# Patient Record
Sex: FEMALE | Race: WHITE | Employment: FULL TIME | URBAN - METROPOLITAN AREA
[De-identification: names, ages, dates, MRNs, and addresses within clinical notes are randomized per-mention and may not be internally consistent; named-entity substitution may affect disease eponyms.]

---

## 2017-03-17 RX ORDER — LEVONORGESTREL AND ETHINYL ESTRADIOL 0.1-0.02MG
KIT ORAL
Qty: 84 TABLET | Refills: 0 | Status: SHIPPED | OUTPATIENT
Start: 2017-03-17 | End: 2017-04-14

## 2017-04-14 ENCOUNTER — OFFICE VISIT (OUTPATIENT)
Dept: FAMILY MEDICINE CLINIC | Facility: CLINIC | Age: 27
End: 2017-04-14

## 2017-04-14 VITALS
HEIGHT: 63 IN | OXYGEN SATURATION: 97 % | BODY MASS INDEX: 37.56 KG/M2 | HEART RATE: 78 BPM | DIASTOLIC BLOOD PRESSURE: 82 MMHG | SYSTOLIC BLOOD PRESSURE: 118 MMHG | RESPIRATION RATE: 18 BRPM | TEMPERATURE: 98 F | WEIGHT: 212 LBS

## 2017-04-14 DIAGNOSIS — J01.00 ACUTE MAXILLARY SINUSITIS, RECURRENCE NOT SPECIFIED: Primary | ICD-10-CM

## 2017-04-14 DIAGNOSIS — R09.82 POST-NASAL DRIP: ICD-10-CM

## 2017-04-14 PROCEDURE — 99213 OFFICE O/P EST LOW 20 MIN: CPT | Performed by: PHYSICIAN ASSISTANT

## 2017-04-14 RX ORDER — FLUTICASONE PROPIONATE 50 MCG
1 SPRAY, SUSPENSION (ML) NASAL DAILY
Qty: 1 BOTTLE | Refills: 1 | Status: SHIPPED | OUTPATIENT
Start: 2017-04-14 | End: 2017-05-14

## 2017-04-14 RX ORDER — DOXYCYCLINE HYCLATE 100 MG
100 TABLET ORAL 2 TIMES DAILY
Qty: 20 TABLET | Refills: 0 | Status: SHIPPED | OUTPATIENT
Start: 2017-04-14 | End: 2018-08-09

## 2017-04-14 NOTE — PROGRESS NOTES
HPI:   Surinder Valverde is a 23year old female who presents for sinus symptoms for  1  weeks. Pt originally scheduled for physical but she does not wish to do this today. Would like to discuss her sinus/cold symptoms.  Pt seen at the [address] 37.56 kg/m2  SpO2 97%  LMP 03/31/2017  GENERAL: well developed and in no apparent distress  SKIN: warm & dry, no rash  EYES:PERRLA, conjunctiva are clear  HEENT: atraumatic, normocephalic, TMs on right with effusion and mild erythema, +widened light reflex

## 2017-06-09 RX ORDER — LEVONORGESTREL AND ETHINYL ESTRADIOL 0.1-0.02MG
KIT ORAL
Qty: 84 TABLET | Refills: 0 | Status: SHIPPED | OUTPATIENT
Start: 2017-06-09 | End: 2018-06-26

## 2017-09-07 RX ORDER — LEVONORGESTREL AND ETHINYL ESTRADIOL 0.1-0.02MG
KIT ORAL
Qty: 84 TABLET | Refills: 0 | Status: SHIPPED | OUTPATIENT
Start: 2017-09-07 | End: 2018-06-26

## 2017-11-30 RX ORDER — LEVONORGESTREL AND ETHINYL ESTRADIOL 0.1-0.02MG
KIT ORAL
Qty: 84 TABLET | Refills: 0 | Status: SHIPPED | OUTPATIENT
Start: 2017-11-30 | End: 2018-06-26

## 2018-02-22 RX ORDER — LEVONORGESTREL AND ETHINYL ESTRADIOL 0.1-0.02MG
KIT ORAL
Qty: 84 TABLET | Refills: 0 | Status: SHIPPED | OUTPATIENT
Start: 2018-02-22 | End: 2018-06-26

## 2018-06-26 RX ORDER — LEVONORGESTREL AND ETHINYL ESTRADIOL 0.1-0.02MG
KIT ORAL
Qty: 84 TABLET | Refills: 0 | Status: SHIPPED | OUTPATIENT
Start: 2018-06-26 | End: 2018-09-28

## 2018-08-09 ENCOUNTER — OFFICE VISIT (OUTPATIENT)
Dept: FAMILY MEDICINE CLINIC | Facility: CLINIC | Age: 28
End: 2018-08-09
Payer: COMMERCIAL

## 2018-08-09 VITALS
BODY MASS INDEX: 37.56 KG/M2 | HEIGHT: 63 IN | DIASTOLIC BLOOD PRESSURE: 80 MMHG | HEART RATE: 118 BPM | SYSTOLIC BLOOD PRESSURE: 124 MMHG | TEMPERATURE: 99 F | WEIGHT: 212 LBS

## 2018-08-09 DIAGNOSIS — N30.01 ACUTE CYSTITIS WITH HEMATURIA: Primary | ICD-10-CM

## 2018-08-09 LAB
MULTISTIX LOT#: NORMAL NUMERIC
PH, URINE: 5 (ref 4.5–8)
SPECIFIC GRAVITY: 1.02 (ref 1–1.03)

## 2018-08-09 PROCEDURE — 99213 OFFICE O/P EST LOW 20 MIN: CPT | Performed by: INTERNAL MEDICINE

## 2018-08-09 PROCEDURE — 81003 URINALYSIS AUTO W/O SCOPE: CPT | Performed by: INTERNAL MEDICINE

## 2018-08-09 PROCEDURE — 87086 URINE CULTURE/COLONY COUNT: CPT | Performed by: INTERNAL MEDICINE

## 2018-08-09 RX ORDER — CIPROFLOXACIN 250 MG/1
250 TABLET, FILM COATED ORAL 2 TIMES DAILY
Qty: 14 TABLET | Refills: 0 | Status: SHIPPED | OUTPATIENT
Start: 2018-08-09 | End: 2018-08-16

## 2018-08-09 NOTE — PROGRESS NOTES
HPI:   Myrtle Reis is a 33year old female who presents with urinary symptoms for 2 days. Complains of no urinary frequency, no urgency, + dysuria, no suprapubic pressure. Denies back pain. Denies hematuria. Denies fever.     Diabetic:no  Used [place]

## 2018-09-28 RX ORDER — LEVONORGESTREL AND ETHINYL ESTRADIOL 0.1-0.02MG
KIT ORAL
Qty: 84 TABLET | Refills: 0 | Status: SHIPPED | OUTPATIENT
Start: 2018-09-28 | End: 2020-03-31

## 2018-12-24 RX ORDER — LEVONORGESTREL AND ETHINYL ESTRADIOL 0.1-0.02MG
KIT ORAL
Qty: 84 TABLET | Refills: 0 | OUTPATIENT
Start: 2018-12-24

## 2018-12-26 NOTE — TELEPHONE ENCOUNTER
I tried calling the patient to let her know she needs to make a appt for future refills but her voicemail is full.

## 2019-01-15 RX ORDER — LEVONORGESTREL AND ETHINYL ESTRADIOL 0.1-0.02MG
KIT ORAL
Qty: 84 TABLET | Refills: 0 | Status: SHIPPED | OUTPATIENT
Start: 2019-01-15 | End: 2020-03-31

## 2019-04-08 RX ORDER — LEVONORGESTREL AND ETHINYL ESTRADIOL 0.1-0.02MG
KIT ORAL
Qty: 84 TABLET | Refills: 0 | Status: SHIPPED | OUTPATIENT
Start: 2019-04-08 | End: 2020-03-31

## 2019-07-05 RX ORDER — LEVONORGESTREL AND ETHINYL ESTRADIOL 0.1-0.02MG
KIT ORAL
Qty: 84 TABLET | Refills: 0 | Status: SHIPPED | OUTPATIENT
Start: 2019-07-05 | End: 2020-03-31

## 2020-03-31 ENCOUNTER — OFFICE VISIT (OUTPATIENT)
Dept: OBGYN CLINIC | Facility: CLINIC | Age: 30
End: 2020-03-31
Payer: COMMERCIAL

## 2020-03-31 ENCOUNTER — LAB ENCOUNTER (OUTPATIENT)
Dept: LAB | Facility: REFERENCE LAB | Age: 30
End: 2020-03-31
Attending: OBSTETRICS & GYNECOLOGY
Payer: COMMERCIAL

## 2020-03-31 VITALS
HEIGHT: 62 IN | SYSTOLIC BLOOD PRESSURE: 118 MMHG | BODY MASS INDEX: 28.34 KG/M2 | WEIGHT: 154 LBS | DIASTOLIC BLOOD PRESSURE: 74 MMHG

## 2020-03-31 DIAGNOSIS — Z36.9 ENCOUNTER FOR ANTENATAL SCREENING OF MOTHER: Primary | ICD-10-CM

## 2020-03-31 DIAGNOSIS — Z36.9 ENCOUNTER FOR ANTENATAL SCREENING OF MOTHER: ICD-10-CM

## 2020-03-31 PROBLEM — O09.30 LATE PRENATAL CARE: Status: ACTIVE | Noted: 2020-03-31

## 2020-03-31 PROBLEM — Z34.92 ENCOUNTER FOR SUPERVISION OF NORMAL PREGNANCY IN SECOND TRIMESTER: Status: ACTIVE | Noted: 2020-03-31

## 2020-03-31 PROBLEM — Z34.92 ENCOUNTER FOR SUPERVISION OF NORMAL PREGNANCY IN SECOND TRIMESTER (HCC): Status: ACTIVE | Noted: 2020-03-31

## 2020-03-31 PROBLEM — O09.30 LATE PRENATAL CARE (HCC): Status: ACTIVE | Noted: 2020-03-31

## 2020-03-31 PROCEDURE — 86901 BLOOD TYPING SEROLOGIC RH(D): CPT

## 2020-03-31 PROCEDURE — 85025 COMPLETE CBC W/AUTO DIFF WBC: CPT

## 2020-03-31 PROCEDURE — 99204 OFFICE O/P NEW MOD 45 MIN: CPT | Performed by: OBSTETRICS & GYNECOLOGY

## 2020-03-31 PROCEDURE — 86850 RBC ANTIBODY SCREEN: CPT

## 2020-03-31 PROCEDURE — 36415 COLL VENOUS BLD VENIPUNCTURE: CPT

## 2020-03-31 PROCEDURE — 86780 TREPONEMA PALLIDUM: CPT

## 2020-03-31 PROCEDURE — 87389 HIV-1 AG W/HIV-1&-2 AB AG IA: CPT

## 2020-03-31 PROCEDURE — 86762 RUBELLA ANTIBODY: CPT

## 2020-03-31 PROCEDURE — 86900 BLOOD TYPING SEROLOGIC ABO: CPT

## 2020-03-31 PROCEDURE — 87340 HEPATITIS B SURFACE AG IA: CPT

## 2020-03-31 NOTE — PATIENT INSTRUCTIONS
Adapting to Pregnancy: Second Trimester    Keep up the healthy habits you started in your first trimester. You might be a little more tired than normal. So plan your day wisely. Look at the tips below and choose the ones that suit your lifestyle.   If you Continuing as lovers  Unless your healthcare provider tells you otherwise, there is no reason to stop having sex now. Blood supply increases to the pelvic area in the second trimester. Because of this, sex might be more enjoyable.  Try different positions a

## 2020-03-31 NOTE — H&P
Called and confirmed appointment with patient for Tues 6/5/18 at 1:30p.   [facility]  Obstetrics and Gynecology    Subjective:     Angela Michael is a 30year old  female presents with c/o secondary amenorrhea and positive pregnancy test.  No LMP recorded (within months). Patient is pregnant. .  The Smokeless tobacco: Never Used    Substance and Sexual Activity      Alcohol use:  Yes        Alcohol/week: 0.0 standard drinks        Comment: SOCIALLY      Drug use: No      Sexual activity: Not on file    Lifestyle      Physical activity:        Days per uterine fundus palpated at the umbilicus, non distended; non tender, no masses  EXTREMITIES:  Normal range of motion, strength 5/5, nontender without edema    Labs:   20 Point-of-care urine pregnancy test positive    Imagin20 bedside ultras

## 2020-04-01 ENCOUNTER — ULTRASOUND ENCOUNTER (OUTPATIENT)
Dept: OBGYN CLINIC | Facility: CLINIC | Age: 30
End: 2020-04-01
Payer: COMMERCIAL

## 2020-04-01 ENCOUNTER — APPOINTMENT (OUTPATIENT)
Dept: LAB | Facility: REFERENCE LAB | Age: 30
End: 2020-04-01
Attending: OBSTETRICS & GYNECOLOGY
Payer: COMMERCIAL

## 2020-04-01 DIAGNOSIS — Z36.9 ENCOUNTER FOR ANTENATAL SCREENING OF MOTHER: ICD-10-CM

## 2020-04-01 DIAGNOSIS — Z36.9 ENCOUNTER FOR ANTENATAL SCREENING OF MOTHER: Primary | ICD-10-CM

## 2020-04-01 PROCEDURE — 76805 OB US >/= 14 WKS SNGL FETUS: CPT | Performed by: OBSTETRICS & GYNECOLOGY

## 2020-04-01 PROCEDURE — 76817 TRANSVAGINAL US OBSTETRIC: CPT | Performed by: OBSTETRICS & GYNECOLOGY

## 2020-04-15 ENCOUNTER — ROUTINE PRENATAL (OUTPATIENT)
Dept: OBGYN CLINIC | Facility: CLINIC | Age: 30
End: 2020-04-15
Payer: COMMERCIAL

## 2020-04-15 VITALS
DIASTOLIC BLOOD PRESSURE: 74 MMHG | SYSTOLIC BLOOD PRESSURE: 112 MMHG | HEIGHT: 62 IN | BODY MASS INDEX: 28.89 KG/M2 | WEIGHT: 157 LBS

## 2020-04-15 DIAGNOSIS — Z36.9 UNSPECIFIED ANTENATAL SCREENING: Primary | ICD-10-CM

## 2020-04-15 PROBLEM — Z13.79 GENETIC SCREENING: Status: ACTIVE | Noted: 2020-04-15

## 2020-04-15 PROCEDURE — 81002 URINALYSIS NONAUTO W/O SCOPE: CPT | Performed by: OBSTETRICS & GYNECOLOGY

## 2020-04-15 NOTE — PROGRESS NOTES
[address]  Obstetrics and Gynecology  History & Physical    CC: Patient is here to establish prenatal care     Subjective:     HPI:  Marion Cortez is a 30year old  female at 26w1d who presents today to establish prenatal ca use: Yes      Alcohol/week: 0.0 standard drinks      Comment: SOCIALLY    Drug use: No      Marital status- Not , boyfriend is father of baby and supportive    Patient feels unsafe or threatened?: No    Review of Systems:  General:  denies fevers, c Repeat US ordered. - continue PNV daily      Genetic Screening tests  - Discussion held with patient about negative cell free DNA and fetal infant.   - Reviewed Foresight carrier screen negative.   - Thalassemia: pt noted her mother has thalassemia and wh

## 2020-04-30 ENCOUNTER — ROUTINE PRENATAL (OUTPATIENT)
Dept: OBGYN CLINIC | Facility: CLINIC | Age: 30
End: 2020-04-30
Payer: COMMERCIAL

## 2020-04-30 ENCOUNTER — LAB ENCOUNTER (OUTPATIENT)
Dept: LAB | Facility: REFERENCE LAB | Age: 30
End: 2020-04-30
Attending: OBSTETRICS & GYNECOLOGY
Payer: COMMERCIAL

## 2020-04-30 VITALS
HEIGHT: 62 IN | DIASTOLIC BLOOD PRESSURE: 58 MMHG | BODY MASS INDEX: 29.85 KG/M2 | SYSTOLIC BLOOD PRESSURE: 108 MMHG | WEIGHT: 162.19 LBS

## 2020-04-30 DIAGNOSIS — N89.8 VAGINAL DISCHARGE: ICD-10-CM

## 2020-04-30 DIAGNOSIS — Z36.9 UNSPECIFIED ANTENATAL SCREENING: ICD-10-CM

## 2020-04-30 DIAGNOSIS — Z36.9 ENCOUNTER FOR ANTENATAL SCREENING OF MOTHER: Primary | ICD-10-CM

## 2020-04-30 PROCEDURE — 82950 GLUCOSE TEST: CPT

## 2020-04-30 PROCEDURE — 90715 TDAP VACCINE 7 YRS/> IM: CPT | Performed by: OBSTETRICS & GYNECOLOGY

## 2020-04-30 PROCEDURE — 87591 N.GONORRHOEAE DNA AMP PROB: CPT | Performed by: OBSTETRICS & GYNECOLOGY

## 2020-04-30 PROCEDURE — 87808 TRICHOMONAS ASSAY W/OPTIC: CPT | Performed by: OBSTETRICS & GYNECOLOGY

## 2020-04-30 PROCEDURE — 87205 SMEAR GRAM STAIN: CPT | Performed by: OBSTETRICS & GYNECOLOGY

## 2020-04-30 PROCEDURE — 87106 FUNGI IDENTIFICATION YEAST: CPT | Performed by: OBSTETRICS & GYNECOLOGY

## 2020-04-30 PROCEDURE — 87491 CHLMYD TRACH DNA AMP PROBE: CPT | Performed by: OBSTETRICS & GYNECOLOGY

## 2020-04-30 PROCEDURE — 36415 COLL VENOUS BLD VENIPUNCTURE: CPT

## 2020-04-30 PROCEDURE — 85025 COMPLETE CBC W/AUTO DIFF WBC: CPT

## 2020-04-30 PROCEDURE — 90471 IMMUNIZATION ADMIN: CPT | Performed by: OBSTETRICS & GYNECOLOGY

## 2020-04-30 PROCEDURE — 81002 URINALYSIS NONAUTO W/O SCOPE: CPT | Performed by: OBSTETRICS & GYNECOLOGY

## 2020-04-30 NOTE — PROGRESS NOTES
Tdap Vaccine administered in RIGHT arm IM. Patient is 28w2d pregnant. Patient tolerated injection well no reaction at this time. 2 patient identifiers verified with pt. Ordering Dr. Wally Ashley.

## 2020-04-30 NOTE — PROGRESS NOTES
WILDA  Pt is a 30year old  at 28w2d   Doing well. Denies LOF/VB/uctx. +FM.    Rh positive, TDAP today  Fetal movement count reviewed    BPM   FH 28 cm   Pap collected today and vaginal culture for discharge noted on exam  Follow up anatomy sca

## 2020-05-06 ENCOUNTER — TELEPHONE (OUTPATIENT)
Dept: FAMILY MEDICINE CLINIC | Facility: CLINIC | Age: 30
End: 2020-05-06

## 2020-05-06 NOTE — TELEPHONE ENCOUNTER
Called [facility] results normal.  Component  Ref Range & Units 4/30/20  2:47 PM   Hemoglobin A  95.5 - 100.0 % 97.1    Hemoglobin A2  1.5 - 3.5 % 2.6    Hemoglobin F (Fetal)  0.0 - 2.0 % 0.3    HGB Electophoresis Interp    Comment:    Normal capillary hem

## 2020-05-07 ENCOUNTER — ULTRASOUND ENCOUNTER (OUTPATIENT)
Dept: OBGYN CLINIC | Facility: CLINIC | Age: 30
End: 2020-05-07
Payer: COMMERCIAL

## 2020-05-07 DIAGNOSIS — Z36.9 UNSPECIFIED ANTENATAL SCREENING: ICD-10-CM

## 2020-05-07 PROCEDURE — 76816 OB US FOLLOW-UP PER FETUS: CPT | Performed by: OBSTETRICS & GYNECOLOGY

## 2020-06-01 ENCOUNTER — ROUTINE PRENATAL (OUTPATIENT)
Dept: OBGYN CLINIC | Facility: CLINIC | Age: 30
End: 2020-06-01
Payer: COMMERCIAL

## 2020-06-01 VITALS
HEIGHT: 62 IN | DIASTOLIC BLOOD PRESSURE: 56 MMHG | SYSTOLIC BLOOD PRESSURE: 108 MMHG | WEIGHT: 165 LBS | BODY MASS INDEX: 30.36 KG/M2

## 2020-06-01 DIAGNOSIS — O99.013 ANEMIA AFFECTING PREGNANCY IN THIRD TRIMESTER: ICD-10-CM

## 2020-06-01 DIAGNOSIS — O09.30 LATE PRENATAL CARE: ICD-10-CM

## 2020-06-01 DIAGNOSIS — Z36.9 UNSPECIFIED ANTENATAL SCREENING: Primary | ICD-10-CM

## 2020-06-01 NOTE — PROGRESS NOTES
No complaints with active FM. Reviewed results of 28w anemia Hgb of 10.6 and normal Hgb electrophoresis. To begin Slow Fe and schedule next 2 appointments with Brayan Rodriguez and Isabella.

## 2020-06-15 ENCOUNTER — ROUTINE PRENATAL (OUTPATIENT)
Dept: OBGYN CLINIC | Facility: CLINIC | Age: 30
End: 2020-06-15
Payer: COMMERCIAL

## 2020-06-15 VITALS
SYSTOLIC BLOOD PRESSURE: 120 MMHG | BODY MASS INDEX: 30.91 KG/M2 | WEIGHT: 168 LBS | HEIGHT: 62 IN | DIASTOLIC BLOOD PRESSURE: 64 MMHG

## 2020-06-15 DIAGNOSIS — Z36.9 UNSPECIFIED ANTENATAL SCREENING: Primary | ICD-10-CM

## 2020-06-15 PROCEDURE — 81002 URINALYSIS NONAUTO W/O SCOPE: CPT | Performed by: OBSTETRICS & GYNECOLOGY

## 2020-06-15 NOTE — PROGRESS NOTES
Audrey Celesteins here for prenatal check. She has no complaints. She reports active fetal movements. Her prenatal exam today was completely normal I discussed fetal kick counts, spontaneous rupture of membranes, vaginal bleeding with her.   Also discussed was eliud

## 2020-06-29 ENCOUNTER — LAB ENCOUNTER (OUTPATIENT)
Dept: LAB | Facility: REFERENCE LAB | Age: 30
End: 2020-06-29
Attending: OBSTETRICS & GYNECOLOGY
Payer: COMMERCIAL

## 2020-06-29 ENCOUNTER — ROUTINE PRENATAL (OUTPATIENT)
Dept: OBGYN CLINIC | Facility: CLINIC | Age: 30
End: 2020-06-29
Payer: COMMERCIAL

## 2020-06-29 VITALS
DIASTOLIC BLOOD PRESSURE: 66 MMHG | WEIGHT: 173 LBS | BODY MASS INDEX: 31.83 KG/M2 | HEIGHT: 62 IN | SYSTOLIC BLOOD PRESSURE: 110 MMHG

## 2020-06-29 DIAGNOSIS — Z36.9 UNSPECIFIED ANTENATAL SCREENING: ICD-10-CM

## 2020-06-29 DIAGNOSIS — Z36.9 UNSPECIFIED ANTENATAL SCREENING: Primary | ICD-10-CM

## 2020-06-29 LAB
DEPRECATED RDW RBC AUTO: 39.7 FL (ref 35.1–46.3)
ERYTHROCYTE [DISTWIDTH] IN BLOOD BY AUTOMATED COUNT: 12.2 % (ref 11–15)
HCT VFR BLD AUTO: 31.4 % (ref 35–48)
HGB BLD-MCNC: 10.6 G/DL (ref 12–16)
MCH RBC QN AUTO: 30.3 PG (ref 26–34)
MCHC RBC AUTO-ENTMCNC: 33.8 G/DL (ref 31–37)
MCV RBC AUTO: 89.7 FL (ref 80–100)
PLATELET # BLD AUTO: 165 10(3)UL (ref 150–450)
RBC # BLD AUTO: 3.5 X10(6)UL (ref 3.8–5.3)
WBC # BLD AUTO: 12 X10(3) UL (ref 4–11)

## 2020-06-29 PROCEDURE — 87081 CULTURE SCREEN ONLY: CPT | Performed by: OBSTETRICS & GYNECOLOGY

## 2020-06-29 PROCEDURE — 86780 TREPONEMA PALLIDUM: CPT

## 2020-06-29 PROCEDURE — 87653 STREP B DNA AMP PROBE: CPT | Performed by: OBSTETRICS & GYNECOLOGY

## 2020-06-29 PROCEDURE — 85027 COMPLETE CBC AUTOMATED: CPT

## 2020-06-29 PROCEDURE — 81002 URINALYSIS NONAUTO W/O SCOPE: CPT | Performed by: OBSTETRICS & GYNECOLOGY

## 2020-06-29 PROCEDURE — 36415 COLL VENOUS BLD VENIPUNCTURE: CPT

## 2020-06-29 PROCEDURE — 87389 HIV-1 AG W/HIV-1&-2 AB AG IA: CPT

## 2020-07-01 ENCOUNTER — ANESTHESIA (OUTPATIENT)
Dept: OBGYN UNIT | Facility: HOSPITAL | Age: 30
End: 2020-07-01
Payer: COMMERCIAL

## 2020-07-01 ENCOUNTER — HOSPITAL ENCOUNTER (INPATIENT)
Facility: HOSPITAL | Age: 30
LOS: 2 days | Discharge: HOME | End: 2020-07-03
Attending: OBSTETRICS & GYNECOLOGY | Admitting: OBSTETRICS & GYNECOLOGY
Payer: COMMERCIAL

## 2020-07-01 ENCOUNTER — ANESTHESIA EVENT (OUTPATIENT)
Dept: OBGYN UNIT | Facility: HOSPITAL | Age: 30
End: 2020-07-01
Payer: COMMERCIAL

## 2020-07-01 PROBLEM — Z34.90 PREGNANCY (HCC): Status: ACTIVE | Noted: 2020-07-01

## 2020-07-01 PROBLEM — Z34.90 PREGNANCY: Status: ACTIVE | Noted: 2020-07-01

## 2020-07-01 LAB
ANTIBODY SCREEN: NEGATIVE
BASOPHILS # BLD AUTO: 0.04 X10(3) UL (ref 0–0.2)
BASOPHILS NFR BLD AUTO: 0.3 %
DEPRECATED RDW RBC AUTO: 38.8 FL (ref 35.1–46.3)
EOSINOPHIL # BLD AUTO: 0.09 X10(3) UL (ref 0–0.7)
EOSINOPHIL NFR BLD AUTO: 0.6 %
ERYTHROCYTE [DISTWIDTH] IN BLOOD BY AUTOMATED COUNT: 12 % (ref 11–15)
HCT VFR BLD AUTO: 32.1 % (ref 35–48)
HGB BLD-MCNC: 11.1 G/DL (ref 12–16)
IMM GRANULOCYTES # BLD AUTO: 0.18 X10(3) UL (ref 0–1)
IMM GRANULOCYTES NFR BLD: 1.3 %
LYMPHOCYTES # BLD AUTO: 1.88 X10(3) UL (ref 1–4)
LYMPHOCYTES NFR BLD AUTO: 13.1 %
MCH RBC QN AUTO: 30.6 PG (ref 26–34)
MCHC RBC AUTO-ENTMCNC: 34.6 G/DL (ref 31–37)
MCV RBC AUTO: 88.4 FL (ref 80–100)
MONOCYTES # BLD AUTO: 0.89 X10(3) UL (ref 0.1–1)
MONOCYTES NFR BLD AUTO: 6.2 %
NEUTROPHILS # BLD AUTO: 11.25 X10 (3) UL (ref 1.5–7.7)
NEUTROPHILS # BLD AUTO: 11.25 X10(3) UL (ref 1.5–7.7)
NEUTROPHILS NFR BLD AUTO: 78.5 %
PLATELET # BLD AUTO: 177 10(3)UL (ref 150–450)
RBC # BLD AUTO: 3.63 X10(6)UL (ref 3.8–5.3)
RH BLOOD TYPE: POSITIVE
SARS-COV-2 RNA RESP QL NAA+PROBE: NOT DETECTED
T PALLIDUM AB SER QL IA: NONREACTIVE
T PALLIDUM AB SER QL: NEGATIVE
WBC # BLD AUTO: 14.3 X10(3) UL (ref 4–11)

## 2020-07-01 PROCEDURE — 86901 BLOOD TYPING SEROLOGIC RH(D): CPT | Performed by: OBSTETRICS & GYNECOLOGY

## 2020-07-01 PROCEDURE — 0HQ9XZZ REPAIR PERINEUM SKIN, EXTERNAL APPROACH: ICD-10-PCS | Performed by: OBSTETRICS & GYNECOLOGY

## 2020-07-01 PROCEDURE — 99204 OFFICE O/P NEW MOD 45 MIN: CPT

## 2020-07-01 PROCEDURE — 85025 COMPLETE CBC W/AUTO DIFF WBC: CPT | Performed by: OBSTETRICS & GYNECOLOGY

## 2020-07-01 PROCEDURE — 86850 RBC ANTIBODY SCREEN: CPT | Performed by: OBSTETRICS & GYNECOLOGY

## 2020-07-01 PROCEDURE — 86900 BLOOD TYPING SEROLOGIC ABO: CPT | Performed by: OBSTETRICS & GYNECOLOGY

## 2020-07-01 PROCEDURE — 86780 TREPONEMA PALLIDUM: CPT | Performed by: OBSTETRICS & GYNECOLOGY

## 2020-07-01 RX ORDER — AMMONIA INHALANTS 0.04 G/.3ML
0.3 INHALANT RESPIRATORY (INHALATION) AS NEEDED
Status: DISCONTINUED | OUTPATIENT
Start: 2020-07-01 | End: 2020-07-01 | Stop reason: HOSPADM

## 2020-07-01 RX ORDER — BISACODYL 10 MG
10 SUPPOSITORY, RECTAL RECTAL ONCE AS NEEDED
Status: DISCONTINUED | OUTPATIENT
Start: 2020-07-01 | End: 2020-07-03

## 2020-07-01 RX ORDER — ONDANSETRON 2 MG/ML
4 INJECTION INTRAMUSCULAR; INTRAVENOUS EVERY 6 HOURS PRN
Status: DISCONTINUED | OUTPATIENT
Start: 2020-07-01 | End: 2020-07-01 | Stop reason: HOSPADM

## 2020-07-01 RX ORDER — TRISODIUM CITRATE DIHYDRATE AND CITRIC ACID MONOHYDRATE 500; 334 MG/5ML; MG/5ML
30 SOLUTION ORAL AS NEEDED
Status: DISCONTINUED | OUTPATIENT
Start: 2020-07-01 | End: 2020-07-01 | Stop reason: HOSPADM

## 2020-07-01 RX ORDER — DIPHENHYDRAMINE HYDROCHLORIDE 50 MG/ML
12.5 INJECTION INTRAMUSCULAR; INTRAVENOUS EVERY 4 HOURS PRN
Status: DISCONTINUED | OUTPATIENT
Start: 2020-07-01 | End: 2020-07-01

## 2020-07-01 RX ORDER — EPHEDRINE SULFATE/0.9% NACL/PF 25 MG/5 ML
5 SYRINGE (ML) INTRAVENOUS AS NEEDED
Status: DISCONTINUED | OUTPATIENT
Start: 2020-07-01 | End: 2020-07-01

## 2020-07-01 RX ORDER — MELATONIN
325
Status: ON HOLD | COMMUNITY
End: 2020-07-03

## 2020-07-01 RX ORDER — ACETAMINOPHEN 500 MG
500 TABLET ORAL EVERY 6 HOURS PRN
Status: DISCONTINUED | OUTPATIENT
Start: 2020-07-01 | End: 2020-07-01 | Stop reason: HOSPADM

## 2020-07-01 RX ORDER — ACETAMINOPHEN 325 MG/1
650 TABLET ORAL EVERY 6 HOURS PRN
Status: DISCONTINUED | OUTPATIENT
Start: 2020-07-01 | End: 2020-07-03

## 2020-07-01 RX ORDER — SODIUM CHLORIDE, SODIUM LACTATE, POTASSIUM CHLORIDE, CALCIUM CHLORIDE 600; 310; 30; 20 MG/100ML; MG/100ML; MG/100ML; MG/100ML
INJECTION, SOLUTION INTRAVENOUS CONTINUOUS
Status: DISCONTINUED | OUTPATIENT
Start: 2020-07-01 | End: 2020-07-01

## 2020-07-01 RX ORDER — SIMETHICONE 80 MG
80 TABLET,CHEWABLE ORAL 3 TIMES DAILY PRN
Status: DISCONTINUED | OUTPATIENT
Start: 2020-07-01 | End: 2020-07-03

## 2020-07-01 RX ORDER — CLINDAMYCIN PHOSPHATE 900 MG/50ML
900 INJECTION INTRAVENOUS EVERY 8 HOURS
Status: DISCONTINUED | OUTPATIENT
Start: 2020-07-01 | End: 2020-07-01 | Stop reason: HOSPADM

## 2020-07-01 RX ORDER — DOCUSATE SODIUM 100 MG/1
100 CAPSULE, LIQUID FILLED ORAL 2 TIMES DAILY
Status: DISCONTINUED | OUTPATIENT
Start: 2020-07-01 | End: 2020-07-03

## 2020-07-01 RX ORDER — IBUPROFEN 600 MG/1
600 TABLET ORAL EVERY 6 HOURS
Status: DISCONTINUED | OUTPATIENT
Start: 2020-07-01 | End: 2020-07-03

## 2020-07-01 RX ORDER — IBUPROFEN 600 MG/1
600 TABLET ORAL EVERY 6 HOURS PRN
Status: DISCONTINUED | OUTPATIENT
Start: 2020-07-01 | End: 2020-07-01 | Stop reason: HOSPADM

## 2020-07-01 RX ORDER — TERBUTALINE SULFATE 1 MG/ML
0.25 INJECTION, SOLUTION SUBCUTANEOUS AS NEEDED
Status: DISCONTINUED | OUTPATIENT
Start: 2020-07-01 | End: 2020-07-01 | Stop reason: HOSPADM

## 2020-07-01 NOTE — PROGRESS NOTES
NURSING ADMISSION NOTE      Patient admitted via Wheelchair  Oriented to room. Safety precautions initiated. Bed in low position. Call light in reach.   Received patient, AOx4, VSS,  in comfortable bed, IV heplocked,  postpartum care instructions ini

## 2020-07-01 NOTE — PROGRESS NOTES
Called after laboring down on peanut ball  Head seems lower per RN and urge to push improved. Tracing reassuring.

## 2020-07-01 NOTE — PROGRESS NOTES
Pt is a 30year old female admitted to 110/110-A. Patient presents with:  R/o Labor     Pt is  37w1d intra-uterine pregnancy. History obtained, consents signed. Oriented to room, staff, and plan of care.

## 2020-07-01 NOTE — L&D DELIVERY NOTE
Vaginal Delivery Note          Thierry Ludwig Patient Status:  Inpatient    1990 MRN [id number]   Location [address] Attending Jolie Alejo MD   Hosp Day # 0 PCP

## 2020-07-01 NOTE — PROGRESS NOTES
30year old  at 37+1 weeks gestation presents to triage with painful contractions that started last night at around 10pm.  Pt's boyfriend states that she helped them move today and was doing more than she should have.   Pt states that she did not eat un

## 2020-07-01 NOTE — ANESTHESIA PROCEDURE NOTES
Labor Analgesia  Performed by: Jillian Sy MD  Authorized by: Jillian Sy MD       General Information and Staff    Start Time:  7/1/2020 5:27 AM  End Time:  7/1/2020 5:40 AM  Anesthesiologist:  Jillian Sy MD  Performed by:   Anesthesiologist

## 2020-07-01 NOTE — PROGRESS NOTES
Patient up to bathroom with assist x 2. Voided an unmeasured amount at this time. Patient transferred to mother/baby room 1117 per wheelchair in stable condition with baby and personal belongings. Accompanied by significant other and staff.   Report given

## 2020-07-01 NOTE — PLAN OF CARE
Problem: Patient/Family Goals  Goal: Patient/Family Long Term Goal  Description  Patient's Long Term Goal:   Safe vaginal delivery  Interventions:   Follow prescribed plan of care  - See additional Care Plan goals for specific interventions  Outcome: Prog

## 2020-07-01 NOTE — ANESTHESIA PREPROCEDURE EVALUATION
PRE-OP EVALUATION    Patient Name: Asha Ludwig    Pre-op Diagnosis: IUP, Labor pain     Labor epidural    Pre-op vitals reviewed. Temp: 98.2 °F (36.8 °C)  Pulse: 81  BP: 120/76  SpO2: 99 %  Body mass index is 31.09 kg/m².     Current medications r (+) anemia                   Pulmonary    Negative pulmonary ROS.             (-) recent URI          Neuro/Psych    Negative neuro/psych ROS. History reviewed. No pertinent surgical history.   Social History    Tobacco

## 2020-07-02 LAB
BASOPHILS # BLD AUTO: 0.05 X10(3) UL (ref 0–0.2)
BASOPHILS NFR BLD AUTO: 0.4 %
DEPRECATED RDW RBC AUTO: 40.8 FL (ref 35.1–46.3)
EOSINOPHIL # BLD AUTO: 0.19 X10(3) UL (ref 0–0.7)
EOSINOPHIL NFR BLD AUTO: 1.4 %
ERYTHROCYTE [DISTWIDTH] IN BLOOD BY AUTOMATED COUNT: 12.3 % (ref 11–15)
HCT VFR BLD AUTO: 29.8 % (ref 35–48)
HGB BLD-MCNC: 10 G/DL (ref 12–16)
IMM GRANULOCYTES # BLD AUTO: 0.17 X10(3) UL (ref 0–1)
IMM GRANULOCYTES NFR BLD: 1.2 %
LYMPHOCYTES # BLD AUTO: 2.93 X10(3) UL (ref 1–4)
LYMPHOCYTES NFR BLD AUTO: 21.4 %
MCH RBC QN AUTO: 30.7 PG (ref 26–34)
MCHC RBC AUTO-ENTMCNC: 33.6 G/DL (ref 31–37)
MCV RBC AUTO: 91.4 FL (ref 80–100)
MONOCYTES # BLD AUTO: 0.84 X10(3) UL (ref 0.1–1)
MONOCYTES NFR BLD AUTO: 6.1 %
NEUTROPHILS # BLD AUTO: 9.51 X10 (3) UL (ref 1.5–7.7)
NEUTROPHILS # BLD AUTO: 9.51 X10(3) UL (ref 1.5–7.7)
NEUTROPHILS NFR BLD AUTO: 69.5 %
PLATELET # BLD AUTO: 156 10(3)UL (ref 150–450)
RBC # BLD AUTO: 3.26 X10(6)UL (ref 3.8–5.3)
WBC # BLD AUTO: 13.7 X10(3) UL (ref 4–11)

## 2020-07-02 PROCEDURE — 85025 COMPLETE CBC W/AUTO DIFF WBC: CPT | Performed by: OBSTETRICS & GYNECOLOGY

## 2020-07-02 NOTE — PROGRESS NOTES
[facility]  Post-Partum Progress Note    Lauren Trujillo Oziel Patient Status:  Inpatient    1990 MRN [id number]   [facility] 1SW-J Attending Cara Hall MD   [facility] Day # 1 PCP Ernestina Lowry,      SUBJECTIVE:    Postpartum Day

## 2020-07-03 VITALS
WEIGHT: 170 LBS | TEMPERATURE: 98 F | HEART RATE: 62 BPM | OXYGEN SATURATION: 99 % | BODY MASS INDEX: 31.28 KG/M2 | HEIGHT: 62 IN | RESPIRATION RATE: 19 BRPM | SYSTOLIC BLOOD PRESSURE: 104 MMHG | DIASTOLIC BLOOD PRESSURE: 62 MMHG

## 2020-07-03 NOTE — PROGRESS NOTES
[facility]  Post-Partum Progress Note    Tala Ludwig Patient Status:  Inpatient    1990 MRN [id number]   [facility] 1SW-J Attending Erica Kayser, MD   [address] Day # 2 PCP Aundrea Asif DO     SUBJECTIVE:    Postpartum Day

## 2020-07-03 NOTE — PROGRESS NOTES
Instructions completed with no further questions. ID number on mom/baby tag checked for match. Discharged with baby in [facility].

## 2020-07-07 ENCOUNTER — TELEPHONE (OUTPATIENT)
Dept: OBGYN UNIT | Facility: HOSPITAL | Age: 30
End: 2020-07-07

## 2020-07-07 NOTE — PROGRESS NOTES
Pedro Painter Call completed: Mom reports that she and infant are doing well. Has had pediatrician F/U visit. Reminded to schedule postpartum follow up visit. No complaints of PPD. Reviewed basic self and infant care.    Encouraged to follow up

## 2020-10-06 PROBLEM — Z13.79 GENETIC SCREENING: Status: RESOLVED | Noted: 2020-04-15 | Resolved: 2020-10-06

## 2020-10-06 PROBLEM — O09.30 LATE PRENATAL CARE: Status: RESOLVED | Noted: 2020-03-31 | Resolved: 2020-10-06

## 2020-10-06 PROBLEM — Z34.90 PREGNANCY (HCC): Status: RESOLVED | Noted: 2020-07-01 | Resolved: 2020-10-06

## 2020-10-06 PROBLEM — O99.013 ANEMIA AFFECTING PREGNANCY IN THIRD TRIMESTER (HCC): Status: RESOLVED | Noted: 2020-03-31 | Resolved: 2020-10-06

## 2020-10-06 PROBLEM — Z34.90 PREGNANCY: Status: RESOLVED | Noted: 2020-07-01 | Resolved: 2020-10-06

## 2020-10-06 PROBLEM — O09.30 LATE PRENATAL CARE (HCC): Status: RESOLVED | Noted: 2020-03-31 | Resolved: 2020-10-06

## 2020-10-06 PROBLEM — O99.013 ANEMIA AFFECTING PREGNANCY IN THIRD TRIMESTER: Status: RESOLVED | Noted: 2020-03-31 | Resolved: 2020-10-06

## 2023-09-18 ENCOUNTER — OFFICE VISIT (OUTPATIENT)
Dept: FAMILY MEDICINE CLINIC | Facility: CLINIC | Age: 33
End: 2023-09-18
Payer: COMMERCIAL

## 2023-09-18 VITALS
WEIGHT: 193 LBS | SYSTOLIC BLOOD PRESSURE: 112 MMHG | HEIGHT: 62 IN | DIASTOLIC BLOOD PRESSURE: 80 MMHG | OXYGEN SATURATION: 100 % | BODY MASS INDEX: 35.51 KG/M2 | RESPIRATION RATE: 16 BRPM | HEART RATE: 82 BPM

## 2023-09-18 DIAGNOSIS — Z00.00 WELLNESS EXAMINATION: Primary | ICD-10-CM

## 2023-09-18 DIAGNOSIS — Z30.011 ENCOUNTER FOR INITIAL PRESCRIPTION OF CONTRACEPTIVE PILLS: ICD-10-CM

## 2023-09-18 DIAGNOSIS — Z13.0 SCREENING FOR DEFICIENCY ANEMIA: ICD-10-CM

## 2023-09-18 DIAGNOSIS — Z13.220 LIPID SCREENING: ICD-10-CM

## 2023-09-18 RX ORDER — NORETHINDRONE ACETATE AND ETHINYL ESTRADIOL 1MG-20(21)
1 KIT ORAL DAILY
Qty: 84 TABLET | Refills: 3 | Status: SHIPPED | OUTPATIENT
Start: 2023-09-18

## 2024-08-03 ENCOUNTER — OFFICE VISIT (OUTPATIENT)
Dept: FAMILY MEDICINE CLINIC | Facility: CLINIC | Age: 34
End: 2024-08-03
Payer: COMMERCIAL

## 2024-08-03 VITALS
RESPIRATION RATE: 18 BRPM | WEIGHT: 196 LBS | TEMPERATURE: 99 F | DIASTOLIC BLOOD PRESSURE: 78 MMHG | BODY MASS INDEX: 36 KG/M2 | SYSTOLIC BLOOD PRESSURE: 116 MMHG | OXYGEN SATURATION: 98 % | HEART RATE: 100 BPM

## 2024-08-03 DIAGNOSIS — J06.9 URI WITH COUGH AND CONGESTION: ICD-10-CM

## 2024-08-03 DIAGNOSIS — H10.9 CONJUNCTIVITIS OF RIGHT EYE, UNSPECIFIED CONJUNCTIVITIS TYPE: ICD-10-CM

## 2024-08-03 DIAGNOSIS — H65.92 LEFT NON-SUPPURATIVE OTITIS MEDIA: Primary | ICD-10-CM

## 2024-08-03 RX ORDER — TOBRAMYCIN 3 MG/ML
1 SOLUTION/ DROPS OPHTHALMIC EVERY 6 HOURS
Qty: 1 EACH | Refills: 0 | Status: SHIPPED | OUTPATIENT
Start: 2024-08-03 | End: 2024-08-10

## 2024-08-03 RX ORDER — DOXYCYCLINE HYCLATE 100 MG
100 TABLET ORAL 2 TIMES DAILY
Qty: 20 TABLET | Refills: 0 | Status: SHIPPED | OUTPATIENT
Start: 2024-08-03 | End: 2024-08-13

## 2024-08-03 NOTE — PATIENT INSTRUCTIONS
Rest   Push fluids   Tylenol or ibuprofen OTC as needed for pain/fever  Start antibiotic   Claritin or Zyrtec OTC once daily for nasal drainage  Ok to start eye drop if persistent mucous discharge develops   Please follow up with PCP if no improvement or if symptoms worsen

## 2024-08-03 NOTE — PROGRESS NOTES
CHIEF COMPLAINT:     Chief Complaint   Patient presents with    Cold     Possible pink eye, ear infection - Entered by patient x 1 week ago started with cold, right eye was crusted shut, left ear is muffled, coughing this morning.       HPI:   Aydee Ludwig is a 34 year old female who presents for cold symptoms for one week.  No fever. No body aches/chills. + headache. + nasal congestion. + sinus pressure. Left ear pain and fullness. + decreased hearing. No sore throat. + mild cough that started this am. No chest pain or SOB. No GI symptoms. No covid at home testing. Taking Mucinex OTC    Woke up with right eye glued shut. + crusting and mucous in the am only- none since waking up. + mild redness. No itch or pain. No vision issues. No contacts        Current Outpatient Medications   Medication Sig Dispense Refill    tobramycin 0.3 % Ophthalmic Solution Place 1 drop into the right eye every 6 (six) hours for 7 days. 1 each 0    Doxycycline Hyclate 100 MG Oral Tab Take 1 tablet (100 mg total) by mouth 2 (two) times daily for 10 days. 20 tablet 0    Norethin Ace-Eth Estrad-FE 1-20 MG-MCG Oral Tab Take 1 tablet by mouth daily. 84 tablet 3      No past medical history on file.   No past surgical history on file.   Family History   Problem Relation Age of Onset    Cancer Paternal Grandfather     Cancer Paternal Grandmother       Social History     Socioeconomic History    Marital status: Single   Tobacco Use    Smoking status: Former    Smokeless tobacco: Never   Substance and Sexual Activity    Alcohol use: Not Currently    Drug use: No    Sexual activity: Not Currently     Partners: Male   Other Topics Concern    Caffeine Concern Yes    Exercise Yes         REVIEW OF SYSTEMS:   GENERAL:  denies  diminished appetite  SKIN: no rashes or abnormal skin lesions  HEENT: See HPI.    LUNGS: denies shortness of breath or wheezing, See HPI  CARDIOVASCULAR: denies chest pain or palpitations   GI: denies N/V/C or abdominal  pain  NEURO: + sinus headaches.  No numbness or tingling in face.    EXAM:   /78   Pulse 100   Temp 99.1 °F (37.3 °C) (Oral)   Resp 18   Wt 196 lb (88.9 kg)   LMP 07/03/2024 (Approximate)   SpO2 98%   BMI 35.85 kg/m²   Physical Exam  Constitutional:       Appearance: Normal appearance.   HENT:      Head: Normocephalic and atraumatic.      Right Ear: Tympanic membrane, ear canal and external ear normal.      Left Ear: Ear canal and external ear normal. Tympanic membrane is erythematous and bulging.      Nose: Congestion present.      Mouth/Throat:      Mouth: Mucous membranes are moist.      Pharynx: Oropharynx is clear. Posterior oropharyngeal erythema (post nasal drip) present.   Eyes:      General: Lids are normal.         Right eye: No discharge.         Left eye: No discharge.      Extraocular Movements: Extraocular movements intact.      Conjunctiva/sclera:      Right eye: Right conjunctiva is injected.      Left eye: Left conjunctiva is not injected.      Pupils: Pupils are equal, round, and reactive to light.   Cardiovascular:      Rate and Rhythm: Normal rate and regular rhythm.      Heart sounds: Normal heart sounds. No murmur heard.  Pulmonary:      Effort: Pulmonary effort is normal.      Breath sounds: Normal breath sounds. No wheezing or rhonchi.   Musculoskeletal:      Cervical back: Normal range of motion and neck supple.   Lymphadenopathy:      Cervical: No cervical adenopathy.   Skin:     General: Skin is warm.      Findings: No rash.   Neurological:      Mental Status: She is alert and oriented to person, place, and time.        Visual Acuity     Vision Screen Test Type: Snellen Wall Chart    Right Eye Visual Acuity: Uncorrected Right Eye Chart Acuity: 20/40   Left Eye Visual Acuity: Uncorrected Left Eye Chart Acuity: 20/30   Both Eyes Visual Acuity: Uncorrected Both Eyes Chart Acuity: 20/25       No results found for this or any previous visit (from the past 24 hour(s)).    ASSESSMENT  AND PLAN:   Aydee Ludwig is a 34 year old female who presents with URI symptoms that are consistent with:      ASSESSMENT:  Encounter Diagnoses   Name Primary?    Left non-suppurative otitis media Yes    Conjunctivitis of right eye, unspecified conjunctivitis type     URI with cough and congestion      Suspect eye related to acute congestion/URI. Discussed if persistent mucous discharge develops ok to start antibiotic drop- if no persistent discharge do not start     PLAN: Meds as below.  Comfort care instructions as listed in Patient Instructions    Meds & Refills for this Visit:  Requested Prescriptions     Signed Prescriptions Disp Refills    tobramycin 0.3 % Ophthalmic Solution 1 each 0     Sig: Place 1 drop into the right eye every 6 (six) hours for 7 days.    Doxycycline Hyclate 100 MG Oral Tab 20 tablet 0     Sig: Take 1 tablet (100 mg total) by mouth 2 (two) times daily for 10 days.       Risks, benefits, side effects of medication addressed and explained. Sun protection. Patient has never taken cephalosporin with PCN allergy- would rather start doxy then risk cephalosporin reaction     Patient Instructions   Rest   Push fluids   Tylenol or ibuprofen OTC as needed for pain/fever  Start antibiotic   Claritin or Zyrtec OTC once daily for nasal drainage  Ok to start eye drop if persistent mucous discharge develops   Please follow up with PCP if no improvement or if symptoms worsen      The patient indicates understanding of these issues and agrees to the plan.  The patient is asked to f/u with PCP if sx's persist or worsen.

## 2024-08-29 DIAGNOSIS — Z30.011 ENCOUNTER FOR INITIAL PRESCRIPTION OF CONTRACEPTIVE PILLS: ICD-10-CM

## 2024-08-29 RX ORDER — NORETHINDRONE ACETATE AND ETHINYL ESTRADIOL AND FERROUS FUMARATE 1MG-20(21)
1 KIT ORAL DAILY
Qty: 84 TABLET | Refills: 3 | Status: SHIPPED | OUTPATIENT
Start: 2024-08-29

## 2025-08-05 DIAGNOSIS — Z30.011 ENCOUNTER FOR INITIAL PRESCRIPTION OF CONTRACEPTIVE PILLS: ICD-10-CM

## 2025-08-05 RX ORDER — NORETHINDRONE ACETATE AND ETHINYL ESTRADIOL AND FERROUS FUMARATE 1MG-20(21)
1 KIT ORAL DAILY
Qty: 84 TABLET | Refills: 3 | OUTPATIENT
Start: 2025-08-05

## (undated) NOTE — MR AVS SNAPSHOT
[address]  [address] [id number] [phone]               Thank you for choosing us for your health care visit with Hipolito Robert, [address].   We are glad to serve you and happy to provide you with this - Fluticasone Propionate 50 MCG/ACT Susp            MyChart     Sign up for [employer], your secure online medical record. [employer] will allow you to access patient instructions from your recent visit,  view other health information, and more.  To sign up or f Get your heart pumping – brisk walking, biking, swimming Even 10 minute increments are effective and add up over the week   2 ½ hours per week – spread out over time Use a amber to keep you motivated   Don’t forget strength training with weights and resist